# Patient Record
Sex: MALE | Race: WHITE
[De-identification: names, ages, dates, MRNs, and addresses within clinical notes are randomized per-mention and may not be internally consistent; named-entity substitution may affect disease eponyms.]

---

## 2019-01-28 ENCOUNTER — HOSPITAL ENCOUNTER (EMERGENCY)
Dept: HOSPITAL 62 - ER | Age: 34
Discharge: HOME | End: 2019-01-28
Payer: SELF-PAY

## 2019-01-28 VITALS — SYSTOLIC BLOOD PRESSURE: 106 MMHG | DIASTOLIC BLOOD PRESSURE: 90 MMHG

## 2019-01-28 DIAGNOSIS — F10.230: Primary | ICD-10-CM
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
## 2019-01-28 DIAGNOSIS — F17.210: ICD-10-CM

## 2019-01-28 LAB
ADD MANUAL DIFF: NO
ALBUMIN SERPL-MCNC: 4.9 G/DL (ref 3.5–5)
ALP SERPL-CCNC: 69 U/L (ref 38–126)
ALT SERPL-CCNC: 65 U/L (ref 21–72)
ANION GAP SERPL CALC-SCNC: 12 MMOL/L (ref 5–19)
APAP SERPL-MCNC: < 10 UG/ML (ref 10–30)
APPEARANCE UR: CLEAR
APTT PPP: YELLOW S
AST SERPL-CCNC: 74 U/L (ref 17–59)
BARBITURATES UR QL SCN: NEGATIVE
BASOPHILS # BLD AUTO: 0 10^3/UL (ref 0–0.2)
BASOPHILS NFR BLD AUTO: 0.4 % (ref 0–2)
BILIRUB DIRECT SERPL-MCNC: 0.2 MG/DL (ref 0–0.4)
BILIRUB SERPL-MCNC: 0.4 MG/DL (ref 0.2–1.3)
BILIRUB UR QL STRIP: NEGATIVE
BUN SERPL-MCNC: 9 MG/DL (ref 7–20)
CALCIUM: 8.9 MG/DL (ref 8.4–10.2)
CHLORIDE SERPL-SCNC: 102 MMOL/L (ref 98–107)
CO2 SERPL-SCNC: 30 MMOL/L (ref 22–30)
EOSINOPHIL # BLD AUTO: 0 10^3/UL (ref 0–0.6)
EOSINOPHIL NFR BLD AUTO: 0.5 % (ref 0–6)
ERYTHROCYTE [DISTWIDTH] IN BLOOD BY AUTOMATED COUNT: 12.5 % (ref 11.5–14)
ETHANOL SERPL-MCNC: 216 MG/DL
GLUCOSE SERPL-MCNC: 104 MG/DL (ref 75–110)
GLUCOSE UR STRIP-MCNC: NEGATIVE MG/DL
HCT VFR BLD CALC: 44.3 % (ref 37.9–51)
HGB BLD-MCNC: 15.3 G/DL (ref 13.5–17)
KETONES UR STRIP-MCNC: NEGATIVE MG/DL
LYMPHOCYTES # BLD AUTO: 1.7 10^3/UL (ref 0.5–4.7)
LYMPHOCYTES NFR BLD AUTO: 26.3 % (ref 13–45)
MCH RBC QN AUTO: 32.2 PG (ref 27–33.4)
MCHC RBC AUTO-ENTMCNC: 34.6 G/DL (ref 32–36)
MCV RBC AUTO: 93 FL (ref 80–97)
METHADONE UR QL SCN: NEGATIVE
MONOCYTES # BLD AUTO: 0.5 10^3/UL (ref 0.1–1.4)
MONOCYTES NFR BLD AUTO: 7.3 % (ref 3–13)
NEUTROPHILS # BLD AUTO: 4.4 10^3/UL (ref 1.7–8.2)
NEUTS SEG NFR BLD AUTO: 65.5 % (ref 42–78)
NITRITE UR QL STRIP: NEGATIVE
PCP UR QL SCN: NEGATIVE
PH UR STRIP: 6 [PH] (ref 5–9)
PLATELET # BLD: 228 10^3/UL (ref 150–450)
POTASSIUM SERPL-SCNC: 4.9 MMOL/L (ref 3.6–5)
PROT SERPL-MCNC: 7.7 G/DL (ref 6.3–8.2)
PROT UR STRIP-MCNC: NEGATIVE MG/DL
RBC # BLD AUTO: 4.75 10^6/UL (ref 4.35–5.55)
SALICYLATES SERPL-MCNC: < 1 MG/DL (ref 2–20)
SODIUM SERPL-SCNC: 143.9 MMOL/L (ref 137–145)
SP GR UR STRIP: 1.02
TOTAL CELLS COUNTED % (AUTO): 100 %
URINE BENZODIAZEPINES SCREEN: NEGATIVE
URINE COCAINE SCREEN: NEGATIVE
URINE MARIJUANA (THC) SCREEN: NEGATIVE
UROBILINOGEN UR-MCNC: NEGATIVE MG/DL (ref ?–2)
WBC # BLD AUTO: 6.6 10^3/UL (ref 4–10.5)

## 2019-01-28 PROCEDURE — 96375 TX/PRO/DX INJ NEW DRUG ADDON: CPT

## 2019-01-28 PROCEDURE — 96361 HYDRATE IV INFUSION ADD-ON: CPT

## 2019-01-28 PROCEDURE — 96374 THER/PROPH/DIAG INJ IV PUSH: CPT

## 2019-01-28 PROCEDURE — 99285 EMERGENCY DEPT VISIT HI MDM: CPT

## 2019-01-28 PROCEDURE — 36415 COLL VENOUS BLD VENIPUNCTURE: CPT

## 2019-01-28 PROCEDURE — 93005 ELECTROCARDIOGRAM TRACING: CPT

## 2019-01-28 PROCEDURE — S0119 ONDANSETRON 4 MG: HCPCS

## 2019-01-28 PROCEDURE — 80307 DRUG TEST PRSMV CHEM ANLYZR: CPT

## 2019-01-28 PROCEDURE — 93010 ELECTROCARDIOGRAM REPORT: CPT

## 2019-01-28 PROCEDURE — 80053 COMPREHEN METABOLIC PANEL: CPT

## 2019-01-28 PROCEDURE — 85025 COMPLETE CBC W/AUTO DIFF WBC: CPT

## 2019-01-28 PROCEDURE — 81001 URINALYSIS AUTO W/SCOPE: CPT

## 2019-01-28 RX ADMIN — SODIUM CHLORIDE, SODIUM LACTATE, POTASSIUM CHLORIDE, AND CALCIUM CHLORIDE PRN MLS/HR: .6; .31; .03; .02 INJECTION, SOLUTION INTRAVENOUS at 20:02

## 2019-01-28 RX ADMIN — SODIUM CHLORIDE, SODIUM LACTATE, POTASSIUM CHLORIDE, AND CALCIUM CHLORIDE PRN MLS/HR: .6; .31; .03; .02 INJECTION, SOLUTION INTRAVENOUS at 19:32

## 2019-01-28 NOTE — PSYCHOLOGICAL NOTE
Psych Note





- Psych Note


Date seen by psych provider: 01/28/19


Time seen by psych provider: 14:45


Psych Note: 


Reason for consult:Detox 


Contact Permissions:None





Patient is a 34 yo male presenting to the ED requesting assistance for alcohol 

detox.  Chart review shows no prior MH or S/A visits.  Patient reports that he's

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 

drinking, got lost in his company vehicle, pulled over to call his boss who gave

him an ultimatum to go get help or lose his job.  Patient states his ETOH use 

has escalated over the last two months from being able to hide it by drinking 

only at night to needing to drink when he wakes up in order to function.  He 

reports 1ltr/vodka daily for many years but states "I drink so much it's hard to

say how much it is exactly"  He reports an unknown number of prior detox with 

last one year ago for approximately 6 days with relapse promptly after 

discharge.  He denies ever engaging in rehab or outpatient SAIOP after detox.  

Patient denies family S/A or MH hx, denies SI, HI, and AV/H, denies MH dx, past 

suicide attempts, hx of NSSI or IP treatment.  Patient denies any hx of trauma 

and denies legal problems.  Patient and his fellow employees rent a house in 

[place].  He is not currently on any medication, denies drug allergies or 

seizure disorder but discloses that he's had one seizure during withdrawal in 

the past.





Patient is alert and oriented x 4.  Mood is anxious with mood affect.  Patient 

denies SI, HI, and AV/H, does not appear to be responding to internal stimuli, 

and no delusions were noted.  Conversational speech was WNL for rate, tone, and 

prosody.  Eye contact was maintained.  Thought processes were linear, organized,

and rational.  Intellectual abilities were estimated within the average range.  

Attention/concentration was WNL while, insight, judgment, and impulse control 

were fair.





Diagnosis:


303.90 (F10.20) Alcohol Use Disorder, Severe





Medication recommendations as per psychiatric provider, Dr. Malagon are as 

follows: No medication recommendations at this time





Patient is psychiatrically clear from acute psychiatric services as there is not

risk of harm to self or others aeb Patient denies SI, HI, and AV/H, does not 

appear to be responding to internal stimuli, and no delusions were noted.  

Patient is a 34 yo male presenting to the ED requesting assistance with detox 

from alcohol/is 1ltr.day vodka for unstated number of years.  Patient was 

provided with prychoeducation about the process for connecting to detox services

and given contact information for [facility].  Also provided psychoeducation to patient

about self-pay short, mid range and long-term rehabs available and benefits.  

Plan is for patient to call [facility] upon discharged to obtain a bed.  Consulted Dr. Tomlin in the care and treatment of this patient and ED physician who is in 

agreement with disposition and recommendation.

## 2019-01-28 NOTE — EKG REPORT
SEVERITY:- OTHERWISE NORMAL ECG -

SINUS RHYTHM

ACCELERATED AV CONDUCTION.

:

Confirmed by: Evan Bass MD 28-Jan-2019 15:25:29

## 2019-01-28 NOTE — ER DOCUMENT REPORT
ED Medical Screen (RME)





- General


Chief Complaint: Alcohol Withdrawl


Stated Complaint: WITHDRAWL


Time Seen by Provider: 01/28/19 13:10


Notes: 





33-year-old male patient comes emergency room requesting detox.  States he 

drinks 1 L of liquor on a daily basis.  Last drink was about midnight.  He last 

went through a detox program about a year ago, and states he started drinking 

the day he got out of that 6-day program.  He is concerned about the symptoms he

would experience if he does not continue drinking.  He states he feels like he 

is getting dehydrated now.





His heart rate is in the 80s, and he is not shaking at this time.





I have greeted and performed a rapid initial assessment of this patient.  A 

comprehensive ED assessment and evaluation of the patient, analysis of test 

results and completion of the medical decision making process will be conducted 

by additional ED providers.


TRAVEL OUTSIDE OF THE [place] IN LAST 30 DAYS: No





- Related Data


Allergies/Adverse Reactions: 


                                        





No Known Allergies Allergy (Verified 01/28/19 12:29)

## 2019-01-29 NOTE — ER DOCUMENT REPORT
Entered by CHERELLE WINTER SCRIBE  01/28/19 1937 





Acting as scribe for:LEXIS FLORIAN DO





ED Substance Abuse / Acc. OD





- General


Chief Complaint: Alcohol Withdrawl


Stated Complaint: WITHDRAWL


Time Seen by Provider: 01/28/19 13:10


Mode of Arrival: Ambulatory


Information source: Patient


Notes: 





33-year-old male who presents to the emergency department today with complaints 

of EtOH withdrawal.  Patient states he drinks about a liter of liquor a day.  

Patient states he has gone to rehab in the past and had successfully quit 

drinking for a year or two in the past.  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  Patient states he works for a "[employer] company" and his reason for wanting to quit drinking is being talked to

by his boss, friends, and family.  Patient states the people he is now around 

here will not allow him to drink and he does wish to quit.  Patient states he 

has been in withdrawal in the past and he has had muscle cramping with it today.


TRAVEL OUTSIDE OF THE [place] IN LAST 30 DAYS: No





- Related Data


Allergies/Adverse Reactions: 


                                        





No Known Allergies Allergy (Verified 01/28/19 12:29)


   











Past Medical History





- General


Information source: Patient





- Social History


Smoking Status: Current Every Day Smoker


Cigarette use (# per day): Yes


Chew tobacco use (# tins/day): No


Frequency of alcohol use: Heavy


Drug Abuse: Marijuana


Lives with: Family


Family History: Reviewed & Not Pertinent


Patient has suicidal ideation: No


Patient has homicidal ideation: No


Renal/ Medical History: Denies: Hx Peritoneal Dialysis





Review of Systems





- Review of Systems


Constitutional: See HPI, Other - EtOH withdrawal


EENT: No symptoms reported


Cardiovascular: No symptoms reported


Respiratory: No symptoms reported


Gastrointestinal: See HPI, Nausea


Genitourinary: No symptoms reported


Male Genitourinary: No symptoms reported


Musculoskeletal: See HPI, Muscle pain


Skin: No symptoms reported


Hematologic/Lymphatic: No symptoms reported


Neurological/Psychological: No symptoms reported


-: Yes All other systems reviewed and negative





Physical Exam





- Vital signs


Vitals: 


                                        











Temp Pulse Resp BP Pulse Ox


 


 97.4 F   89   18   122/79   99 


 


 01/28/19 12:34  01/28/19 12:34  01/28/19 12:34  01/28/19 12:34  01/28/19 12:34











Interpretation: Tachycardic





- General


General appearance: Appears well, Alert





- HEENT


Head: Normocephalic, Atraumatic


Eyes: Normal


Pupils: PERRL


Mucous membranes: Dry





- Respiratory


Respiratory status: No respiratory distress


Chest status: Nontender


Breath sounds: Normal


Chest palpation: Normal





- Cardiovascular


Rhythm: Regular


Heart sounds: Normal auscultation


Murmur: No





- Abdominal


Inspection: Normal


Distension: No distension


Bowel sounds: Normal


Tenderness: Nontender


Organomegaly: No organomegaly





- Back


Back: Normal, Nontender





- Extremities


General upper extremity: Normal inspection, Nontender, Normal color, Normal ROM,

Normal temperature


General lower extremity: Normal inspection, Nontender, Normal color, Normal ROM,

Normal temperature, Normal weight bearing.  No: Layne's sign





- Neurological


Neuro grossly intact: Yes


Cognition: Normal


Orientation: AAOx4


Prichard Coma Scale Eye Opening: Spontaneous


Ligia Coma Scale Verbal: Oriented


Prichard Coma Scale Motor: Obeys Commands


Ligia Coma Scale Total: 15


Speech: Normal


Motor strength normal: LUE, RUE, LLE, RLE


Sensory: Normal





- Psychological


Associated symptoms: Normal affect, Normal mood





- Skin


Skin Temperature: Warm


Skin Moisture: Dry


Skin Color: Normal





Course





- Re-evaluation


Re-evalutation: 





01/28


Patient is a 33-year-old male who drinks a liter of vodka and comes in 

complaining of alcohol withdrawal.  Patient's initial alcohol was 210.  This was

6 hours ago.  Initially patient had stable vitals and is not tachycardic.  Given

Valium, fluids, nausea medication.  Patient is taking p.o. and is no longer 

tremulous.  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

and work.  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 

stopped drinking or that they will not go into business with him.  States that 

he will not be around people that are drinking and that he has quit in the past.

 He is not looking for admission to the hospital.  He is just looking to get 

some help for alcohol withdrawal and to stop drinking.  Patient's vitals have 

responded well to 1 dose of Valium in the emergency department as well as 

fluids.  He is taking p.o. and no longer tremulous.  Patient will be given a 

prescription for a Librium taper.  He has been strongly advised that he is not 

to drink alcohol while taking Librium.  If he decides to start drinking again 

then he is not to take the Librium anymore.  Patient understands and agrees with

plan.  He has already been seen by mental health services and given information 

about outpatient rehabilitation.  Medically stable for discharge.  Return 

immediately if any worsening or concerning symptoms, particularly tremulousness,

hallucinations, or seizure activity.  Patient of note, states that he has never 

had hallucinations or seizure activity from alcohol withdrawal.








- Vital Signs


Vital signs: 


                                        











Temp Pulse Resp BP Pulse Ox


 


 97.9 F   111 H  18   106/90 H  100 


 


 01/28/19 21:25  01/28/19 19:01  01/28/19 21:25  01/28/19 21:25  01/28/19 21:25














- Laboratory


Result Diagrams: 


                                 01/28/19 14:30





                                 01/28/19 14:30


Laboratory results interpreted by me: 


                                        











  01/28/19





  14:30


 


AST  74 H


 


Salicylates  < 1.0 L


 


Acetaminophen  < 10 L














Discharge





- Discharge


Clinical Impression: 


Alcohol withdrawal


Qualifiers:


 Complication of substance-induced condition: uncomplicated Qualified Code(s): 

F10.230 - Alcohol dependence with withdrawal, uncomplicated





Condition: Stable


Disposition: HOME, SELF-CARE


Instructions:  Alcohol Withdrawl (OM), Chronic Alcoholism (OM)


Additional Instructions: 


Please follow-up with outpatient rehabilitation services.  Please return if you 

have any worsening symptoms.  Do not drink alcohol when you are taking Librium. 

It is very important as it could cause severe problems.


Prescriptions: 


Chlordiazepoxide HCl [Librium 25 mg Capsule] 50 mg PO ASDIR PRN #20 capsule


 PRN Reason: 


Forms:  Return to Work


Scribe Attestation: 





01/29/19 04:23


I personally performed the services described in the documentation, reviewed and

edited the documentation which was dictated to the scribe in my presence, and it

accurately records my words and actions.





Scribe Documentation





- Scribe


Written by Meme:: Meme Wu, 1/28/2019 1937


acting as scribe for :: Babita





I personally performed the services described in the documentation, reviewed and

edited the documentation which was dictated to the scribe in my presence, and it

accurately records my words and actions.